# Patient Record
Sex: FEMALE | Race: WHITE | NOT HISPANIC OR LATINO | Employment: OTHER | URBAN - NONMETROPOLITAN AREA
[De-identification: names, ages, dates, MRNs, and addresses within clinical notes are randomized per-mention and may not be internally consistent; named-entity substitution may affect disease eponyms.]

---

## 2024-11-12 ENCOUNTER — OFFICE VISIT (OUTPATIENT)
Dept: URGENT CARE | Facility: URGENT CARE | Age: 74
End: 2024-11-12
Payer: MEDICARE

## 2024-11-12 VITALS
HEART RATE: 61 BPM | DIASTOLIC BLOOD PRESSURE: 96 MMHG | TEMPERATURE: 97.9 F | SYSTOLIC BLOOD PRESSURE: 175 MMHG | WEIGHT: 138.89 LBS | BODY MASS INDEX: 23.86 KG/M2 | RESPIRATION RATE: 16 BRPM | OXYGEN SATURATION: 95 %

## 2024-11-12 DIAGNOSIS — L30.9 DERMATITIS: Primary | ICD-10-CM

## 2024-11-12 PROCEDURE — 1160F RVW MEDS BY RX/DR IN RCRD: CPT | Performed by: FAMILY MEDICINE

## 2024-11-12 PROCEDURE — 99203 OFFICE O/P NEW LOW 30 MIN: CPT | Performed by: FAMILY MEDICINE

## 2024-11-12 PROCEDURE — 1159F MED LIST DOCD IN RCRD: CPT | Performed by: FAMILY MEDICINE

## 2024-11-12 RX ORDER — AMLODIPINE BESYLATE 5 MG/1
1 TABLET ORAL
COMMUNITY
Start: 2024-09-11

## 2024-11-12 RX ORDER — LOSARTAN POTASSIUM 100 MG/1
1 TABLET ORAL
COMMUNITY
Start: 2024-10-27

## 2024-11-12 RX ORDER — METOPROLOL SUCCINATE 100 MG/1
150 TABLET, EXTENDED RELEASE ORAL DAILY
COMMUNITY
Start: 2024-09-05

## 2024-11-12 RX ORDER — LEVOTHYROXINE SODIUM 75 UG/1
1 TABLET ORAL
COMMUNITY
Start: 2024-08-26 | End: 2024-11-12 | Stop reason: WASHOUT

## 2024-11-12 RX ORDER — TRIAMCINOLONE ACETONIDE 0.25 MG/G
OINTMENT TOPICAL 2 TIMES DAILY
Qty: 15 G | Refills: 1 | Status: SHIPPED | OUTPATIENT
Start: 2024-11-12

## 2024-11-12 RX ORDER — LEVOTHYROXINE SODIUM 137 UG/1
137 TABLET ORAL
COMMUNITY
Start: 2020-06-12

## 2024-11-12 RX ORDER — NICOTINE POLACRILEX 2 MG
5 GUM BUCCAL
COMMUNITY
Start: 2020-11-11

## 2024-11-12 NOTE — PROGRESS NOTES
Subjective   Patient ID: Jayne Weinstein is a 74 y.o. female.    HPI: 74-year-old female presents with a concern of 2-week history of rash on neck and face that she describes as pruritic.  She reports that she uses only [employer] cosmetic products and has tried no new product in the last 2 weeks.  She states that it feels like the skin is very dry.      History provided by:  Patient   used: No        The following portions of the chart were reviewed this encounter and updated as appropriate:  Tobacco  Allergies  Meds  Problems  Med Hx  Surg Hx  Fam Hx         Review of Systems   Constitutional:  Negative for chills and fever.   HENT:  Negative for congestion, ear pain, rhinorrhea and sore throat.    Respiratory:  Negative for cough, chest tightness, shortness of breath and wheezing.    Cardiovascular:  Negative for palpitations.   Gastrointestinal:  Negative for constipation, diarrhea, nausea and vomiting.   Genitourinary:  Negative for dysuria and frequency.   Skin:  Positive for color change and rash.   Neurological:  Negative for headaches.     Objective   Physical Exam  Vital signs are reviewed.  Blood pressure elevated at 175/96.    Alert and oriented x3 with normal mood and affect  Patient is well nourished, well-developed, alert and in no acute distress    External eyes, orbits, conjunctiva and eyelids are normal in appearance  Pupils are equal, round, reactive to light and accommodation, extraocular movements intact    External ears appear normal  External canals are normal in appearance  Right tympanic membrane is intact and pale gray in appearance  Left tympanic membrane is intact and pale slight gray in appearance  There is no middle ear effusion noted on the right  There is no middle ear effusion noted on the left  External appearance of the nose is normal  Nasal mucosa, septum, turbinates are reddened in appearance  There is thin white nasal discharge in both nares    Oral  "mucosa is uniformly pink and moist  Palate is pink, symmetric and intact  Tongue is moist, mobile and midline  Posterior pharynx not erythematous with no concretions or exudates present  No cervical lymphadenopathy palpated    Heart has regular rate and rhythm. No murmurs, rubs or gallops are auscultated at this exam.    Respiratory rate rhythm and effort are normal. Breath sounds bilaterally are clear on auscultation without crackles, rhonchi, wheezes or friction rub.    Abdomen: Normal bowel sounds on auscultation. Soft, nontender without rebound or rigidity on palpation    Integumentary: There is a salmon-pink rash on the left side of the neck extending to the supra and infraclavicular area.  Patient reports she also has lesions on her face.  These are not visible as she is wearing a heavy make-up base that obscures the skin.  There is no crusting.  There are no pustules, blisters, plaques.  There is slight scale especially in the flexural areas.  Patient reports that she has \"more lines on her face\" than she is ever seen before.    Procedures    Assessment/Plan   Diagnoses and all orders for this visit:  Dermatitis  -     triamcinolone (Kenalog) 0.025 % ointment; Apply topically 2 times a day.      Patient disposition: Home  "

## 2024-11-12 NOTE — PATIENT INSTRUCTIONS
You have a dermatitis of the face and anterior of the neck.  May continue using moisturizing agents.  Please apply a thin film of Kenalog ointment to the affected area twice daily.  Decrease frequency of use as condition of skin improves.  Please follow-up with dermatologist for further evaluation and management.  This note was generated by voice recognition software. Minor transcription/grammatical errors may be present. Please call for clarification.

## 2024-11-13 ASSESSMENT — ENCOUNTER SYMPTOMS
NAUSEA: 0
CHILLS: 0
SORE THROAT: 0
COUGH: 0
DYSURIA: 0
FREQUENCY: 0
CHEST TIGHTNESS: 0
VOMITING: 0
RHINORRHEA: 0
DIARRHEA: 0
SHORTNESS OF BREATH: 0
COLOR CHANGE: 1
FEVER: 0
CONSTIPATION: 0
WHEEZING: 0
PALPITATIONS: 0
HEADACHES: 0